# Patient Record
Sex: FEMALE | Race: WHITE | NOT HISPANIC OR LATINO | Employment: FULL TIME | URBAN - METROPOLITAN AREA
[De-identification: names, ages, dates, MRNs, and addresses within clinical notes are randomized per-mention and may not be internally consistent; named-entity substitution may affect disease eponyms.]

---

## 2019-09-12 ENCOUNTER — HOSPITAL ENCOUNTER (EMERGENCY)
Facility: HOSPITAL | Age: 34
Discharge: HOME | End: 2019-09-12
Attending: EMERGENCY MEDICINE | Admitting: EMERGENCY MEDICINE

## 2019-09-12 VITALS
TEMPERATURE: 98.5 F | DIASTOLIC BLOOD PRESSURE: 79 MMHG | BODY MASS INDEX: 36.25 KG/M2 | HEART RATE: 71 BPM | HEIGHT: 65 IN | RESPIRATION RATE: 16 BRPM | SYSTOLIC BLOOD PRESSURE: 121 MMHG | OXYGEN SATURATION: 99 % | WEIGHT: 217.59 LBS

## 2019-09-12 DIAGNOSIS — N93.9 VAGINAL BLEEDING: Primary | ICD-10-CM

## 2019-09-12 LAB
ABO GROUP BLD: NORMAL
B-HCG UR QL: NEGATIVE
BASOPHILS # BLD AUTO: 0.1 10*3/MM3 (ref 0–0.2)
BASOPHILS NFR BLD AUTO: 0.7 % (ref 0–1.5)
BLD GP AB SCN SERPL QL: NEGATIVE
DEPRECATED RDW RBC AUTO: 42.9 FL (ref 37–54)
EOSINOPHIL # BLD AUTO: 0.1 10*3/MM3 (ref 0–0.4)
EOSINOPHIL NFR BLD AUTO: 1.6 % (ref 0.3–6.2)
ERYTHROCYTE [DISTWIDTH] IN BLOOD BY AUTOMATED COUNT: 14 % (ref 12.3–15.4)
HCT VFR BLD AUTO: 36.1 % (ref 34–46.6)
HGB BLD-MCNC: 12.1 G/DL (ref 12–15.9)
LYMPHOCYTES # BLD AUTO: 2.1 10*3/MM3 (ref 0.7–3.1)
LYMPHOCYTES NFR BLD AUTO: 24.9 % (ref 19.6–45.3)
MCH RBC QN AUTO: 29.2 PG (ref 26.6–33)
MCHC RBC AUTO-ENTMCNC: 33.5 G/DL (ref 31.5–35.7)
MCV RBC AUTO: 87.2 FL (ref 79–97)
MONOCYTES # BLD AUTO: 0.6 10*3/MM3 (ref 0.1–0.9)
MONOCYTES NFR BLD AUTO: 7.3 % (ref 5–12)
NEUTROPHILS # BLD AUTO: 5.6 10*3/MM3 (ref 1.7–7)
NEUTROPHILS NFR BLD AUTO: 65.5 % (ref 42.7–76)
NRBC BLD AUTO-RTO: 0.1 /100 WBC (ref 0–0.2)
PLATELET # BLD AUTO: 185 10*3/MM3 (ref 140–450)
PMV BLD AUTO: 8.8 FL (ref 6–12)
RBC # BLD AUTO: 4.14 10*6/MM3 (ref 3.77–5.28)
RH BLD: POSITIVE
T&S EXPIRATION DATE: NORMAL
WBC NRBC COR # BLD: 8.5 10*3/MM3 (ref 3.4–10.8)

## 2019-09-12 PROCEDURE — 85025 COMPLETE CBC W/AUTO DIFF WBC: CPT | Performed by: EMERGENCY MEDICINE

## 2019-09-12 PROCEDURE — 81025 URINE PREGNANCY TEST: CPT | Performed by: EMERGENCY MEDICINE

## 2019-09-12 PROCEDURE — 86901 BLOOD TYPING SEROLOGIC RH(D): CPT

## 2019-09-12 PROCEDURE — 86901 BLOOD TYPING SEROLOGIC RH(D): CPT | Performed by: EMERGENCY MEDICINE

## 2019-09-12 PROCEDURE — 99283 EMERGENCY DEPT VISIT LOW MDM: CPT

## 2019-09-12 PROCEDURE — 86900 BLOOD TYPING SEROLOGIC ABO: CPT

## 2019-09-12 PROCEDURE — 86900 BLOOD TYPING SEROLOGIC ABO: CPT | Performed by: EMERGENCY MEDICINE

## 2019-09-12 PROCEDURE — 86850 RBC ANTIBODY SCREEN: CPT | Performed by: EMERGENCY MEDICINE

## 2019-09-12 NOTE — ED PROVIDER NOTES
Subjective   Chief complaint vaginal bleeding.  This is a 34-year-old presents with vaginal bleeding and some sharp stabbing crampy back pain she has had for the last 2 days she states the pain is 4/10 she has no other known alleviating or exacerbating factors.  She reports that it has been exactly 1 month since her last menstrual period but states that she feels like this bleeding is not her menstrual.  She has had prior bilateral tubal ligation.  She denies any dysuria frequency urgency or hematuria.        History provided by:  Patient      Review of Systems   Constitutional: Negative for chills and fever.   HENT: Negative for congestion and sore throat.    Eyes: Negative for redness.   Respiratory: Negative for shortness of breath.    Cardiovascular: Negative for chest pain.   Gastrointestinal: Negative for abdominal pain.   Endocrine: Negative for cold intolerance and heat intolerance.   Genitourinary: Positive for vaginal bleeding. Negative for difficulty urinating and dysuria.   Musculoskeletal: Positive for back pain.   Skin: Negative for rash.   Allergic/Immunologic: Negative for immunocompromised state.   Neurological: Negative for dizziness and weakness.   Hematological: Negative for adenopathy.   Psychiatric/Behavioral: Negative for confusion.   All other systems reviewed and are negative.      History reviewed. No pertinent past medical history.    No Known Allergies    History reviewed. No pertinent surgical history.    No family history on file.    Social History     Socioeconomic History   • Marital status:      Spouse name: Not on file   • Number of children: Not on file   • Years of education: Not on file   • Highest education level: Not on file           Objective   Physical Exam   Constitutional: She is oriented to person, place, and time. She appears well-developed and well-nourished. No distress.   HENT:   Head: Normocephalic and atraumatic.   Right Ear: External ear normal.   Left Ear:  External ear normal.   Nose: Nose normal.   Eyes: Conjunctivae and EOM are normal. Pupils are equal, round, and reactive to light.   Neck: Normal range of motion. Neck supple. No JVD present.   Cardiovascular: Normal rate, regular rhythm, normal heart sounds and intact distal pulses.   Pulmonary/Chest: Effort normal and breath sounds normal. No stridor. She has no wheezes. She has no rales.   Abdominal: Soft. Bowel sounds are normal. She exhibits no mass. There is no tenderness. There is no rebound and no guarding. No hernia.   Musculoskeletal: Normal range of motion.   Lymphadenopathy:     She has no cervical adenopathy.   Neurological: She is alert and oriented to person, place, and time. No cranial nerve deficit.   Skin: Skin is warm and dry. Capillary refill takes less than 2 seconds. No rash noted.   Psychiatric: She has a normal mood and affect.   Nursing note and vitals reviewed.  The patient was placed in lithotomy position.  External genitalia were normal there was no evidence of rash external lesions or abscesses.  Speculum was inserted.  Posterior vaginal vault was examined.    There was a minimal amount of blood in the posterior vaginal vault    Bimanual exam was performed.  Uterus was nonenlarged and nontender and there was no adnexal enlargement or tenderness noted.  Os was closed.  No masses were felt.      Procedures           ED Course        No orders to display     Lab Results (last 72 hours)     Procedure Component Value Units Date/Time    Pregnancy, Urine - Urine, Clean Catch [[id number]]  (Normal) Collected:  09/12/19 0437    Specimen:  Urine, Clean Catch Updated:  09/12/19 0451     HCG, Urine QL Negative    CBC & Differential [[id number]] Collected:  09/12/19 0449    Specimen:  Blood Updated:  09/12/19 0501    Narrative:       The following orders were created for panel order CBC & Differential.  Procedure                               Abnormality         Status                     ---------      "                          -----------         ------                     CBC Auto Differential[[id number]]        Normal              Final result                 Please view results for these tests on the individual orders.    CBC Auto Differential [[id number]]  (Normal) Collected:  09/12/19 0449    Specimen:  Blood Updated:  09/12/19 0501     WBC 8.50 10*3/mm3      RBC 4.14 10*6/mm3      Hemoglobin 12.1 g/dL      Hematocrit 36.1 %      MCV 87.2 fL      MCH 29.2 pg      MCHC 33.5 g/dL      RDW 14.0 %      RDW-SD 42.9 fl      MPV 8.8 fL      Platelets 185 10*3/mm3      Neutrophil % 65.5 %      Lymphocyte % 24.9 %      Monocyte % 7.3 %      Eosinophil % 1.6 %      Basophil % 0.7 %      Neutrophils, Absolute 5.60 10*3/mm3      Lymphocytes, Absolute 2.10 10*3/mm3      Monocytes, Absolute 0.60 10*3/mm3      Eosinophils, Absolute 0.10 10*3/mm3      Basophils, Absolute 0.10 10*3/mm3      nRBC 0.1 /100 WBC         Medications - No data to display  /86 (BP Location: Left arm, Patient Position: Sitting)   Pulse 68   Temp 98.4 °F (36.9 °C) (Oral)   Resp 18   Ht 165.1 cm (65\")   Wt 98.7 kg (217 lb 9.5 oz)   SpO2 97%   BMI 36.21 kg/m²   Old records reviewed.  Previous CT scan from August 14 shows some colitis but no other acute disease ultrasound from same date showed no acute abnormality.  Recent laboratories from earlier this month shows no acute abnormalities  I discussed results with the patient as well as follow care instructions the patient expressed understanding.        MDM  Differential diagnosis; this does not constitute the entirety of considered causes:    DKA, intra-abdominal infection, dissection, pneumoperitoneum, peritonitis, peptic ulcer disease, pancreatitis, hepatitis, ischemic bowel, bowel obstruction, appendicitis, cholelithiasis, cholecystitis, ureterolithiasis, nephrolithiasis, pregnancy, ectopic pregnancy    Final diagnoses:   Vaginal bleeding              Xavi Rodriguez MD  09/12/19 " [id number]

## 2019-09-12 NOTE — DISCHARGE INSTRUCTIONS
Push clear fluids.  See PMD in 2 days for recheck.  Return for increased pain fevers or chills nausea vomiting

## 2019-09-12 NOTE — ED NOTES
Pt reports vaginal bleeding x1 day, reports dark red blood with some clots. Pt also c/o low back pain x1 week. Reports she was seen by PCP for back pain, labs performed in office and states she had blood in her urine. Pt reports it is almost time for her menstrual, but states this bleeding feels different than her normal menstrual. LMP 8/9/2019. Pt reports hx tubal ligation.      Rika Owusu, KRISTIE  09/12/19 0514

## 2021-06-22 PROBLEM — I10 HYPERTENSION: Status: ACTIVE | Noted: 2021-06-22

## 2021-06-23 ENCOUNTER — TELEPHONE (OUTPATIENT)
Dept: ONCOLOGY | Facility: CLINIC | Age: 36
End: 2021-06-23

## 2021-06-23 NOTE — TELEPHONE ENCOUNTER
Caller: Megan Madrid    Relationship to patient: Self    Best call back number:[phone]    Chief complaint: PATIENT HAS APPT. SCHEDULED FOR 6/28/21. SHE IS GOING OUT OF TOWN ON 6/25/21 AND NEEDS TO RESCHEDULE.      Type of visit: LAB/NP OV    Requested date: ANYTIME July 1ST    If rescheduling, when is the original appointment: 6/28/21    Additional notes: PLEASE CONTACT PATIENT AT NUMBER LISTED ABOVE TO RESCHEDULE

## 2021-06-25 NOTE — TELEPHONE ENCOUNTER
Contacted patient and rescheduled appointment to 07/21 at 1530. I was unable to schedule appointment due to restrictions, sent information to [facility],  and appointment is rescheduled.

## 2021-06-28 ENCOUNTER — APPOINTMENT (OUTPATIENT)
Dept: LAB | Facility: HOSPITAL | Age: 36
End: 2021-06-28

## 2021-07-16 NOTE — PROGRESS NOTES
Genetic Note    Megan Madrid  1985    Primary Care Physician: Keshav Hernandez MD  Referring Physician: Ann Sweeney MD  Reason For Visit: High Risk Evaluation For malignancy    Chief Complaint   Patient presents with   • Appointment     Family history of cancer       HPI       This is a 36-year-old female who does not have any personal history of cancer but he is here today due to strong family history of breast cancer in her sister at the age of 40.  Patient's father also developed pancreatic cancer and  from metastatic disease in his 50s.  Otherwise there are no additional family cancer history.  Patient has no history of abnormal mammogram.  She had her first mammogram about a month ago.  As far as she is aware there is no maternal family cancer history.    Past Medical History:   Diagnosis Date   • Stomach disorder    • Stomach ulcer        Past Surgical History:   Procedure Laterality Date   • CHOLECYSTECTOMY         No current outpatient medications on file.    No Known Allergies    Family History   Problem Relation Age of Onset   • Breast cancer Sister 40        had Genetic testing, Negative   • Pancreatic cancer Father        Cancer-related family history includes Breast cancer (age of onset: 40) in her sister; Pancreatic cancer in her father.    Social History     Tobacco Use   • Smoking status: Never Smoker   • Smokeless tobacco: Never Used   Substance Use Topics   • Alcohol use: Yes     Comment: 1-2 per week   • Drug use: Never       Review of Systems   Constitutional: Negative for chills and fever.   HENT: Negative for ear pain, mouth sores, nosebleeds and sore throat.    Eyes: Negative for photophobia and visual disturbance.   Respiratory: Negative for wheezing and stridor.    Cardiovascular: Negative for chest pain and palpitations.   Gastrointestinal: Negative for abdominal pain, diarrhea, nausea and vomiting.   Endocrine: Negative for cold intolerance and heat intolerance.  "  Genitourinary: Negative for dysuria and hematuria.   Musculoskeletal: Negative for joint swelling and neck stiffness.   Skin: Negative for color change and rash.   Neurological: Negative for seizures and syncope.   Hematological: Negative for adenopathy.   Psychiatric/Behavioral: Negative for agitation, confusion and hallucinations.       Objective:    Vitals:    07/21/21 1542   BP: 127/86   Pulse: 66   Resp: 18   Temp: 97.5 °F (36.4 °C)   TempSrc: Infrared   Weight: 114 kg (252 lb)   Height: 162.6 cm (64\")   PainSc: 0-No pain       (0) Fully active, able to carry on all predisease performance without restriction    Physical Exam  Vitals and nursing note reviewed.   Constitutional:       General: She is not in acute distress.     Appearance: She is not diaphoretic.   HENT:      Head: Normocephalic and atraumatic.   Eyes:      General: No scleral icterus.        Right eye: No discharge.         Left eye: No discharge.      Conjunctiva/sclera: Conjunctivae normal.   Neck:      Thyroid: No thyromegaly.   Cardiovascular:      Rate and Rhythm: Normal rate and regular rhythm.      Heart sounds: Normal heart sounds. No friction rub. No gallop.    Pulmonary:      Effort: Pulmonary effort is normal. No respiratory distress.      Breath sounds: No stridor. No wheezing.   Abdominal:      General: Bowel sounds are normal.      Palpations: Abdomen is soft. There is no mass.      Tenderness: There is no abdominal tenderness. There is no guarding or rebound.   Musculoskeletal:         General: No tenderness. Normal range of motion.      Cervical back: Normal range of motion and neck supple.   Lymphadenopathy:      Cervical: No cervical adenopathy.   Skin:     General: Skin is warm.      Findings: No erythema or rash.   Neurological:      Mental Status: She is alert and oriented to person, place, and time.      Motor: No abnormal muscle tone.   Psychiatric:         Behavior: Behavior normal.         Assessment/Plan     Family " history of cancer  - Genetic Testing - Blood,  - Genetic Testing - Blood,      1. Family history of breast cancer in her sister at age of 40.  Also family history of pancreatic cancer in her father at age of 59.  There is concern for hereditary cancer syndrome.        Discussion      Today's risk assessment is based on the history the patient has provided.  We discussed that the best people to screen are the ones who have been affected by malignancy as their result is more informative.  We reviewed all the hallmarks of hereditary cancer syndrome including multiple relatives on the same side of the family being affected by malignancy, cancer diagnosis in young individuals, different cancers in one individual.      We discussed the implications of a positive deleterious mutation which can result in recommendations for prophylactic surgeries, chemoprevention, lifestyle modifications and aggressive screening with mammogram and MRI, and also increased breast awareness and breast self exams.  Patient understands if she screens positive, then at-risk family members will need to be screened as well.  Each offspring has a 50% chance of inheriting a deleterious mutation if positive in a parent.      A negative deleterious mutation will make hereditary cancer syndrome much less likely, but does not rule out the possibility of a gene mutation that cannot be detected with the available technology.  She also understands that a negative gene test result might indicate that the cancers that occurred in her family were most likely sporadic, or even if there is a mutation the patient did not inherit it.     We discussed variant of unknown significance.  Patient understands that no medical decisions will be made based on a variant of unknown significance, but I did stress the importance of maintaining contact information with us should this be the case.      We discussed the financial implications of the above testing.  Patient  understands when the right clinical criteria are met that most insurance companies will cover the cost.      We also discussed the various insurability issues with a deleterious mutation result.     Patient has give us consent to proceed with comprehensive genetic analysis.        Plans    · Comprehensive gene analysis with cancer next technology  · Follow-up 6 weeks review results and make further recommendations         Thank you very much allowing participate in the care of Megan, I will keep you updated on her progress             I spent 45 total minutes, face-to-face, caring for Megan today.  80% of this time involved counseling and/or coordination of care as documented within this note.

## 2021-07-21 ENCOUNTER — CONSULT (OUTPATIENT)
Dept: ONCOLOGY | Facility: CLINIC | Age: 36
End: 2021-07-21

## 2021-07-21 ENCOUNTER — LAB (OUTPATIENT)
Dept: LAB | Facility: HOSPITAL | Age: 36
End: 2021-07-21

## 2021-07-21 VITALS
RESPIRATION RATE: 18 BRPM | SYSTOLIC BLOOD PRESSURE: 127 MMHG | BODY MASS INDEX: 43.02 KG/M2 | TEMPERATURE: 97.5 F | HEIGHT: 64 IN | HEART RATE: 66 BPM | DIASTOLIC BLOOD PRESSURE: 86 MMHG | WEIGHT: 252 LBS

## 2021-07-21 DIAGNOSIS — Z80.9 FAMILY HISTORY OF CANCER: Primary | ICD-10-CM

## 2021-07-21 PROCEDURE — 99204 OFFICE O/P NEW MOD 45 MIN: CPT | Performed by: INTERNAL MEDICINE

## 2021-08-16 LAB
REF LAB TEST METHOD: NORMAL
WHOLE BLOOD SORT: NORMAL

## 2021-09-02 ENCOUNTER — APPOINTMENT (OUTPATIENT)
Dept: LAB | Facility: HOSPITAL | Age: 36
End: 2021-09-02

## 2021-09-02 ENCOUNTER — OFFICE VISIT (OUTPATIENT)
Dept: ONCOLOGY | Facility: CLINIC | Age: 36
End: 2021-09-02

## 2021-09-02 VITALS
BODY MASS INDEX: 41.15 KG/M2 | WEIGHT: 241 LBS | HEART RATE: 66 BPM | RESPIRATION RATE: 18 BRPM | SYSTOLIC BLOOD PRESSURE: 131 MMHG | HEIGHT: 64 IN | TEMPERATURE: 97.8 F | DIASTOLIC BLOOD PRESSURE: 85 MMHG

## 2021-09-02 DIAGNOSIS — Z80.9 FAMILY HISTORY OF CANCER: Primary | ICD-10-CM

## 2021-09-02 PROCEDURE — 99214 OFFICE O/P EST MOD 30 MIN: CPT | Performed by: INTERNAL MEDICINE

## 2021-09-02 NOTE — PROGRESS NOTES
Genetic Note    Megan Madrid  1985    Primary Care Physician: Keshav Hernandez MD  Referring Physician: Keshav Hernandez MD  Reason For Visit: High Risk Evaluation For malignancy    Chief Complaint   Patient presents with   • Follow-up     Family history of cancer       HPI       This is a 36-year-old female who does not have any personal history of cancer but he is here today due to strong family history of breast cancer in her sister at the age of 40.  Patient's father also developed pancreatic cancer and  from metastatic disease in his 50s.  Otherwise there are no additional family cancer history.  Patient has no history of abnormal mammogram.  She had her first mammogram about a month ago.  As far as she is aware there is no maternal family cancer history.    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    Past Medical History:   Diagnosis Date   • Stomach disorder    • Stomach ulcer        Past Surgical History:   Procedure Laterality Date   • CHOLECYSTECTOMY         No current outpatient medications on file.    No Known Allergies    Family History   Problem Relation Age of Onset   • Breast cancer Sister 40        had Genetic testing, Negative   • Pancreatic cancer Father        Cancer-related family history includes Breast cancer (age of onset: 40) in her sister; Pancreatic cancer in her father.    Social History     Tobacco Use   • Smoking status: Never Smoker   • Smokeless tobacco: Never Used   Substance Use Topics   • Alcohol use: Yes     Comment: 1-2 per week   • Drug use: Never       Review of Systems   Constitutional: Negative for chills and fever.   HENT: Negative for ear pain, mouth sores, nosebleeds and sore throat.    Eyes: Negative for photophobia and visual disturbance.   Respiratory: Negative for wheezing and stridor.    Cardiovascular: Negative for chest  "pain and palpitations.   Gastrointestinal: Negative for abdominal pain, diarrhea, nausea and vomiting.   Endocrine: Negative for cold intolerance and heat intolerance.   Genitourinary: Negative for dysuria and hematuria.   Musculoskeletal: Negative for joint swelling and neck stiffness.   Skin: Negative for color change and rash.   Neurological: Negative for seizures and syncope.   Hematological: Negative for adenopathy.   Psychiatric/Behavioral: Negative for agitation, confusion and hallucinations.       Objective:    Vitals:    09/02/21 1420   BP: 131/85   Pulse: 66   Resp: 18   Temp: 97.8 °F (36.6 °C)   TempSrc: Infrared   Weight: 109 kg (241 lb)   Height: 162.6 cm (64\")   PainSc: 0-No pain       (0) Fully active, able to carry on all predisease performance without restriction    Physical Exam  Vitals and nursing note reviewed.   Constitutional:       General: She is not in acute distress.     Appearance: She is not diaphoretic.   HENT:      Head: Normocephalic and atraumatic.   Eyes:      General: No scleral icterus.        Right eye: No discharge.         Left eye: No discharge.      Conjunctiva/sclera: Conjunctivae normal.   Neck:      Thyroid: No thyromegaly.   Cardiovascular:      Rate and Rhythm: Normal rate and regular rhythm.      Heart sounds: Normal heart sounds. No friction rub. No gallop.    Pulmonary:      Effort: Pulmonary effort is normal. No respiratory distress.      Breath sounds: No stridor. No wheezing.   Abdominal:      General: Bowel sounds are normal.      Palpations: Abdomen is soft. There is no mass.      Tenderness: There is no abdominal tenderness. There is no guarding or rebound.   Musculoskeletal:         General: No tenderness. Normal range of motion.      Cervical back: Normal range of motion and neck supple.   Lymphadenopathy:      Cervical: No cervical adenopathy.   Skin:     General: Skin is warm.      Findings: No erythema or rash.   Neurological:      Mental Status: She is " alert and oriented to person, place, and time.      Motor: No abnormal muscle tone.   Psychiatric:         Behavior: Behavior normal.       I have reexamined the patient and the results are consistent with the previously documented exam. Johannaaissatou Gallo MD       Assessment/Plan     There are no diagnoses linked to this encounter.    1. Family history of breast cancer in her sister at age of 40.  Also family history of pancreatic cancer in her father at age of 59.  There is concern for hereditary cancer syndrome.  2. Comprehensive gene analysis with cancer next technology was negative for any significant mutation but there was  variant of unknown significance seen in the [facility] L1 gene  3. Sara Jean risk estimates at 18.3 % lifetime risk for breast cancer        Discussion      Today's risk assessment is based on the history the patient has provided.  We discussed that the best people to screen are the ones who have been affected by malignancy as their result is more informative.  We reviewed all the hallmarks of hereditary cancer syndrome including multiple relatives on the same side of the family being affected by malignancy, cancer diagnosis in young individuals, different cancers in one individual.      I have reviewed the results of a comprehensive gene analysis with cancer next technology.  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  We discussed that VUS:    Variant of unknown significance has been found on genetic test.  Based on currently available data it is unclear whether this VUS is pathogenic or benign variant.  80% of variant of unknown significance will return as benign, 20% will truly be pathogenic.  Therefore, I did recommend to patient that there is need to maintain contact information with us for updates on VUS reclassification.  At this point in time, we cannot use variant of unknown significance to make any clinical decisions for patient and family  members.  Clinical decisions will be based on personal history, family history and any positive pathogenic mutations identified.  We also discussed about variant reclassification program.  Patient has been given information to follow up with the genetic lab if there is interest to participate in this study, which may help to clarify this variant in the future.       We have reviewed patient's results.  She has screened negative for any deleterious mutation that could increase her risk for developing cancer.  I explained to patient that the cancers that occurred in her family may have all been sporadic or could still be due to a mutation that we are not able to detect with the available technology.  There are other genes suspected to increase risk breast cancer that are yet to be identified.  About 5% to 10% of all breast cancers are due to genetic mutation, the rest are due to hormonal, reproductive, endocrinology and lifestyle issues.  Patient will continue to monitor her family cancer history and update us of any changes that occur in the future.  Her negative result could imply that even if there is a mutation in the family she may not have inherited it. We discussed the concept of familial breast cancer syndrome, which could play a role in her family cancer development.       We discussed general breast health care such as increased breast awareness, monthly breast self- exams, 12 monthly clinical breast exam and annual mammograms.        We discussed risk modifications such as limiting alcohol ingestion to one to two drinks per week, avoidance of smoking and avoidance of postmenopausal weight gain.  We discussed breast cancer risks associated with prolonged hormone replacement therapy.    We discussed signs and symptoms for patient to watch out for and notify us should they occur including breast lumps, nipple discharge, skin discoloration, breast pain or any other concerns she may have       A copy of her  genetic results have been given to patient for her own records keeping.        Patient has been given opportunities to ask questions, which have all been answered to the best of abilities.          Plans    · Copy of her genetic results was given to patient for her own records keeping  · Follow-up 2 to 3 years from now  · Patient encouraged to keep track of her family cancer history and update us of any changes  · All questions answered         Thank you very much allowing participate in the care of Megan, I will keep you updated on her progress             I spent 30 total minutes, face-to-face, caring for Megan today.  80% of this time involved counseling and/or coordination of care as documented within this note.

## 2021-11-17 ENCOUNTER — APPOINTMENT (OUTPATIENT)
Dept: CT IMAGING | Facility: HOSPITAL | Age: 36
End: 2021-11-17

## 2021-11-17 ENCOUNTER — HOSPITAL ENCOUNTER (EMERGENCY)
Facility: HOSPITAL | Age: 36
Discharge: HOME | End: 2021-11-17
Admitting: EMERGENCY MEDICINE

## 2021-11-17 VITALS
TEMPERATURE: 98.2 F | RESPIRATION RATE: 16 BRPM | WEIGHT: 252.87 LBS | OXYGEN SATURATION: 100 % | HEART RATE: 70 BPM | BODY MASS INDEX: 44.8 KG/M2 | DIASTOLIC BLOOD PRESSURE: 72 MMHG | SYSTOLIC BLOOD PRESSURE: 128 MMHG | HEIGHT: 63 IN

## 2021-11-17 DIAGNOSIS — R10.9 ABDOMINAL PAIN, UNSPECIFIED ABDOMINAL LOCATION: Primary | ICD-10-CM

## 2021-11-17 LAB
ALBUMIN SERPL-MCNC: 4.1 G/DL (ref 3.5–5.2)
ALBUMIN/GLOB SERPL: 1.5 G/DL
ALP SERPL-CCNC: 118 U/L (ref 39–117)
ALT SERPL W P-5'-P-CCNC: 19 U/L (ref 1–33)
ANION GAP SERPL CALCULATED.3IONS-SCNC: 14 MMOL/L (ref 5–15)
AST SERPL-CCNC: 18 U/L (ref 1–32)
B-HCG UR QL: NEGATIVE
BASOPHILS # BLD AUTO: 0 10*3/MM3 (ref 0–0.2)
BASOPHILS NFR BLD AUTO: 0.4 % (ref 0–1.5)
BILIRUB SERPL-MCNC: 0.3 MG/DL (ref 0–1.2)
BILIRUB UR QL STRIP: NEGATIVE
BUN SERPL-MCNC: 13 MG/DL (ref 6–20)
BUN/CREAT SERPL: 15.5 (ref 7–25)
CALCIUM SPEC-SCNC: 8.8 MG/DL (ref 8.6–10.5)
CHLORIDE SERPL-SCNC: 102 MMOL/L (ref 98–107)
CLARITY UR: ABNORMAL
CO2 SERPL-SCNC: 23 MMOL/L (ref 22–29)
COLOR UR: YELLOW
CREAT SERPL-MCNC: 0.84 MG/DL (ref 0.57–1)
DEPRECATED RDW RBC AUTO: 42.4 FL (ref 37–54)
EOSINOPHIL # BLD AUTO: 0.2 10*3/MM3 (ref 0–0.4)
EOSINOPHIL NFR BLD AUTO: 2.3 % (ref 0.3–6.2)
ERYTHROCYTE [DISTWIDTH] IN BLOOD BY AUTOMATED COUNT: 14.1 % (ref 12.3–15.4)
GFR SERPL CREATININE-BSD FRML MDRD: 77 ML/MIN/1.73
GLOBULIN UR ELPH-MCNC: 2.7 GM/DL
GLUCOSE SERPL-MCNC: 89 MG/DL (ref 65–99)
GLUCOSE UR STRIP-MCNC: NEGATIVE MG/DL
HCT VFR BLD AUTO: 37 % (ref 34–46.6)
HGB BLD-MCNC: 12.5 G/DL (ref 12–15.9)
HGB UR QL STRIP.AUTO: NEGATIVE
KETONES UR QL STRIP: NEGATIVE
LEUKOCYTE ESTERASE UR QL STRIP.AUTO: NEGATIVE
LIPASE SERPL-CCNC: 26 U/L (ref 13–60)
LYMPHOCYTES # BLD AUTO: 1.8 10*3/MM3 (ref 0.7–3.1)
LYMPHOCYTES NFR BLD AUTO: 24 % (ref 19.6–45.3)
MCH RBC QN AUTO: 29.4 PG (ref 26.6–33)
MCHC RBC AUTO-ENTMCNC: 33.8 G/DL (ref 31.5–35.7)
MCV RBC AUTO: 87.1 FL (ref 79–97)
MONOCYTES # BLD AUTO: 0.7 10*3/MM3 (ref 0.1–0.9)
MONOCYTES NFR BLD AUTO: 8.8 % (ref 5–12)
NEUTROPHILS NFR BLD AUTO: 4.9 10*3/MM3 (ref 1.7–7)
NEUTROPHILS NFR BLD AUTO: 64.5 % (ref 42.7–76)
NITRITE UR QL STRIP: NEGATIVE
NRBC BLD AUTO-RTO: 0.1 /100 WBC (ref 0–0.2)
PH UR STRIP.AUTO: 7.5 [PH] (ref 5–8)
PLATELET # BLD AUTO: 256 10*3/MM3 (ref 140–450)
PMV BLD AUTO: 8.3 FL (ref 6–12)
POTASSIUM SERPL-SCNC: 4.3 MMOL/L (ref 3.5–5.2)
PROT SERPL-MCNC: 6.8 G/DL (ref 6–8.5)
PROT UR QL STRIP: NEGATIVE
RBC # BLD AUTO: 4.25 10*6/MM3 (ref 3.77–5.28)
SODIUM SERPL-SCNC: 139 MMOL/L (ref 136–145)
SP GR UR STRIP: 1.02 (ref 1–1.03)
UROBILINOGEN UR QL STRIP: ABNORMAL
WBC NRBC COR # BLD: 7.7 10*3/MM3 (ref 3.4–10.8)

## 2021-11-17 PROCEDURE — 99283 EMERGENCY DEPT VISIT LOW MDM: CPT

## 2021-11-17 PROCEDURE — 74177 CT ABD & PELVIS W/CONTRAST: CPT

## 2021-11-17 PROCEDURE — 80053 COMPREHEN METABOLIC PANEL: CPT | Performed by: PHYSICIAN ASSISTANT

## 2021-11-17 PROCEDURE — 81025 URINE PREGNANCY TEST: CPT | Performed by: PHYSICIAN ASSISTANT

## 2021-11-17 PROCEDURE — 0 IOPAMIDOL PER 1 ML: Performed by: PHYSICIAN ASSISTANT

## 2021-11-17 PROCEDURE — 81003 URINALYSIS AUTO W/O SCOPE: CPT | Performed by: PHYSICIAN ASSISTANT

## 2021-11-17 PROCEDURE — 83690 ASSAY OF LIPASE: CPT | Performed by: PHYSICIAN ASSISTANT

## 2021-11-17 PROCEDURE — 85025 COMPLETE CBC W/AUTO DIFF WBC: CPT | Performed by: PHYSICIAN ASSISTANT

## 2021-11-17 RX ORDER — SODIUM CHLORIDE 0.9 % (FLUSH) 0.9 %
10 SYRINGE (ML) INJECTION AS NEEDED
Status: DISCONTINUED | OUTPATIENT
Start: 2021-11-17 | End: 2021-11-17 | Stop reason: HOSPADM

## 2021-11-17 RX ORDER — ONDANSETRON 4 MG/1
4 TABLET, ORALLY DISINTEGRATING ORAL EVERY 8 HOURS PRN
Qty: 20 TABLET | Refills: 0 | Status: SHIPPED | OUTPATIENT
Start: 2021-11-17

## 2021-11-17 RX ADMIN — IOPAMIDOL 100 ML: 755 INJECTION, SOLUTION INTRAVENOUS at 19:03

## 2021-11-17 NOTE — ED PROVIDER NOTES
Subjective   Chief Complaint: Abdominal pain    Patient is a 36-year-old  female history of peptic ulcer disease presents the ER with complaints of lower abdominal pain for 2 days.  Patient reports lower abdominal pain, cramping and pressure that she rates a 6/10.  She denies any vomiting or diarrhea or dysuria.  She does report some nausea.  She denies any vaginal bleeding or discharge.  Denies chest pain shortness of breath headache or fever or chills.  Patient ports abdominal surgical history of tubal ligation and cholecystectomy.    Location: Lower abdominal     Quality: Pressure, cramping    Duration: 2 days    Timing: Constant    Severity: Mild to moderate    Associated Symptoms: Nausea    PCP: Keshav Domínguez      History provided by:  Patient      Review of Systems   Constitutional: Negative for chills and fever.   HENT: Negative for sore throat and trouble swallowing.    Respiratory: Negative for shortness of breath and wheezing.    Cardiovascular: Negative for chest pain.   Gastrointestinal: Positive for abdominal pain and nausea. Negative for diarrhea and vomiting.   Genitourinary: Negative for dysuria, vaginal bleeding and vaginal discharge.   Musculoskeletal: Negative for joint swelling.   Skin: Negative for rash.   Neurological: Negative for weakness and headaches.   Psychiatric/Behavioral: Negative for behavioral problems.   All other systems reviewed and are negative.      Past Medical History:   Diagnosis Date   • Stomach disorder    • Stomach ulcer        No Known Allergies    Past Surgical History:   Procedure Laterality Date   • CHOLECYSTECTOMY  2016       Family History   Problem Relation Age of Onset   • Breast cancer Sister 40        had Genetic testing, Negative   • Pancreatic cancer Father        Social History     Socioeconomic History   • Marital status:    Tobacco Use   • Smoking status: Never Smoker   • Smokeless tobacco: Never Used   Substance and Sexual Activity   • Alcohol  "use: Yes     Comment: 1-2 per week   • Drug use: Never   • Sexual activity: Defer           Objective   Physical Exam  Vitals and nursing note reviewed.   Constitutional:       Appearance: Normal appearance. She is well-developed. She is not ill-appearing or toxic-appearing.   HENT:      Head: Normocephalic and atraumatic.   Eyes:      Pupils: Pupils are equal, round, and reactive to light.   Cardiovascular:      Rate and Rhythm: Normal rate and regular rhythm.      Heart sounds: Normal heart sounds.   Pulmonary:      Effort: Pulmonary effort is normal. No respiratory distress.      Breath sounds: Normal breath sounds. No wheezing.   Abdominal:      General: Abdomen is flat. Bowel sounds are normal. There is no distension.      Palpations: Abdomen is soft.      Tenderness: There is abdominal tenderness in the suprapubic area. There is no right CVA tenderness or left CVA tenderness.   Genitourinary:     Rectum: Normal.   Skin:     General: Skin is warm and dry.      Capillary Refill: Capillary refill takes less than 2 seconds.      Findings: No rash.   Neurological:      Mental Status: She is alert and oriented to person, place, and time.   Psychiatric:         Behavior: Behavior normal.         Procedures           ED Course    /78   Pulse 66   Temp 98.1 °F (36.7 °C) (Oral)   Resp 18   Ht 160 cm (63\")   Wt 115 kg (252 lb 13.9 oz)   SpO2 100%   Breastfeeding No   BMI 44.79 kg/m²   Labs Reviewed   COMPREHENSIVE METABOLIC PANEL - Abnormal; Notable for the following components:       Result Value    Alkaline Phosphatase 118 (*)     All other components within normal limits    Narrative:     GFR Normal >60  Chronic Kidney Disease <60  Kidney Failure <15     URINALYSIS W/ CULTURE IF INDICATED - Abnormal; Notable for the following components:    Appearance, UA Cloudy (*)     All other components within normal limits    Narrative:     Urine microscopic not indicated.   LIPASE - Normal   PREGNANCY, URINE - " Normal   CBC WITH AUTO DIFFERENTIAL - Normal   CBC AND DIFFERENTIAL    Narrative:     The following orders were created for panel order CBC & Differential.  Procedure                               Abnormality         Status                     ---------                               -----------         ------                     CBC Auto Differential[[id number]]        Normal              Final result                 Please view results for these tests on the individual orders.     Medications   sodium chloride 0.9 % flush 10 mL (has no administration in time range)   iopamidol (ISOVUE-370) 76 % injection 100 mL (100 mL Intravenous Given 11/17/21 1903)     CT Abdomen Pelvis With Contrast    Result Date: 11/17/2021  1. No acute abnormality in the abdomen or pelvis. 2. Small cluster of groundglass opacities in medial basilar right lower lobe may relate to pneumonia, correlate for associated clinical findings. 3. Status post cholecystectomy.     Electronically Signed By-Pierre Whaley MD On:11/17/2021 7:36 PM This report was finalized on [id number] by  Pierre Whaley MD.                                           MDM  Number of Diagnoses or Management Options  Abdominal pain, unspecified abdominal location  Diagnosis management comments: MEDICAL DECISION  Epic Chart Review: No recent admissions  Comorbidities: GERD  Differentials: Diverticulitis, ovarian cyst, UTI, viral gastroenteritis; this list is not all inclusive and does not constitute the entirety of considered causes  Radiology interpretation:  Images reviewed by me and interpreted by radiologist, as above  Lab interpretation:  Labs viewed by me significant for, as above    While in the ED IV was placed and labs were obtained appropriate PPE was worn during exam and throughout all encounters with the patient.  Patient had the above evaluation.  Patient was offered pain medication, refused.  Lab work unremarkable including normal CBC, CMP, no UTI.  CT abdomen  pelvis shows no acute intra-abdominal abnormalities.  Patient reevaluated, again offered pain medication and she refused.  Patient recommended follow-up with primary care and GI for further evaluation and treatment as needed.    Discharge plan and instructions were discussed with the patient who verbalized understanding and is in agreement with the plan, all questions were answered at this time.  Patient is aware of signs symptoms that would require immediate return to the emergency room.  Patient understands importance of following up with primary care provider for further evaluation and worsening concerns as well as blood pressure recheck in the next 4 weeks.    Patient was discharged in improved stable condition with an upright steady gait.           Amount and/or Complexity of Data Reviewed  Clinical lab tests: reviewed and ordered  Tests in the radiology section of CPT®: reviewed and ordered    Patient Progress  Patient progress: stable      Final diagnoses:   Abdominal pain, unspecified abdominal location       ED Disposition  ED Disposition     ED Disposition Condition Comment    Discharge Stable           Xavi Manzanares Jr., MD  [address] [facility] [place] [address] [phone]    Schedule an appointment as soon as possible for a visit in 2 days  As needed, If symptoms worsen    Jonathan Maxwell MD  [address] [facility] [place] [address] [phone]    Schedule an appointment as soon as possible for a visit in 2 days  As needed, If symptoms worsen         Medication List      New Prescriptions    ondansetron ODT 4 MG disintegrating tablet  Commonly known as: Zofran ODT  Place 1 tablet under the tongue Every 8 (Eight) Hours As Needed for Nausea.           Where to Get Your Medications      These medications were sent to [id number] - [place], [place] - [address] [address] AT [facility] [phone] [facility] [phone]   [address]  [place] [address]    Phone: [phone]   · ondansetron ODT 4 MG disintegrating tablet          Ruby Yoo PA  11/17/21 2006

## 2021-11-18 NOTE — DISCHARGE INSTRUCTIONS
Take Tylenol or ibuprofen as needed for pain  Take Zofran as needed for nausea.    Drink plenty of fluids    Follow-up with primary care for further management evaluation    Follow-up with GI for further evaluation and treatment as needed    Return to the ER for new or worsening symptoms

## 2023-02-10 ENCOUNTER — APPOINTMENT (OUTPATIENT)
Dept: CT IMAGING | Facility: HOSPITAL | Age: 38
End: 2023-02-10
Payer: COMMERCIAL

## 2023-02-10 ENCOUNTER — HOSPITAL ENCOUNTER (EMERGENCY)
Facility: HOSPITAL | Age: 38
Discharge: HOME | End: 2023-02-10
Attending: EMERGENCY MEDICINE | Admitting: EMERGENCY MEDICINE
Payer: COMMERCIAL

## 2023-02-10 VITALS
WEIGHT: 252.65 LBS | OXYGEN SATURATION: 99 % | DIASTOLIC BLOOD PRESSURE: 94 MMHG | RESPIRATION RATE: 16 BRPM | SYSTOLIC BLOOD PRESSURE: 133 MMHG | TEMPERATURE: 98.3 F | HEIGHT: 64 IN | HEART RATE: 59 BPM | BODY MASS INDEX: 43.13 KG/M2

## 2023-02-10 DIAGNOSIS — Z87.42 HISTORY OF ENDOMETRIOSIS: ICD-10-CM

## 2023-02-10 DIAGNOSIS — M53.3 PAIN IN THE COCCYX: Primary | ICD-10-CM

## 2023-02-10 DIAGNOSIS — N83.202 LEFT OVARIAN CYST: ICD-10-CM

## 2023-02-10 LAB
ANION GAP SERPL CALCULATED.3IONS-SCNC: 10 MMOL/L (ref 5–15)
BASOPHILS # BLD AUTO: 0 10*3/MM3 (ref 0–0.2)
BASOPHILS NFR BLD AUTO: 0.5 % (ref 0–1.5)
BUN SERPL-MCNC: 12 MG/DL (ref 6–20)
BUN/CREAT SERPL: 14.1 (ref 7–25)
CALCIUM SPEC-SCNC: 9.3 MG/DL (ref 8.6–10.5)
CHLORIDE SERPL-SCNC: 102 MMOL/L (ref 98–107)
CO2 SERPL-SCNC: 27 MMOL/L (ref 22–29)
CREAT SERPL-MCNC: 0.85 MG/DL (ref 0.57–1)
DEPRECATED RDW RBC AUTO: 43.8 FL (ref 37–54)
EGFRCR SERPLBLD CKD-EPI 2021: 90.6 ML/MIN/1.73
EOSINOPHIL # BLD AUTO: 0.1 10*3/MM3 (ref 0–0.4)
EOSINOPHIL NFR BLD AUTO: 1.9 % (ref 0.3–6.2)
ERYTHROCYTE [DISTWIDTH] IN BLOOD BY AUTOMATED COUNT: 14.3 % (ref 12.3–15.4)
GLUCOSE SERPL-MCNC: 90 MG/DL (ref 65–99)
HCT VFR BLD AUTO: 39.9 % (ref 34–46.6)
HGB BLD-MCNC: 13 G/DL (ref 12–15.9)
LYMPHOCYTES # BLD AUTO: 1.8 10*3/MM3 (ref 0.7–3.1)
LYMPHOCYTES NFR BLD AUTO: 25.4 % (ref 19.6–45.3)
MCH RBC QN AUTO: 28.6 PG (ref 26.6–33)
MCHC RBC AUTO-ENTMCNC: 32.5 G/DL (ref 31.5–35.7)
MCV RBC AUTO: 87.9 FL (ref 79–97)
MONOCYTES # BLD AUTO: 0.5 10*3/MM3 (ref 0.1–0.9)
MONOCYTES NFR BLD AUTO: 6.9 % (ref 5–12)
NEUTROPHILS NFR BLD AUTO: 4.7 10*3/MM3 (ref 1.7–7)
NEUTROPHILS NFR BLD AUTO: 65.3 % (ref 42.7–76)
NRBC BLD AUTO-RTO: 0 /100 WBC (ref 0–0.2)
PLATELET # BLD AUTO: 256 10*3/MM3 (ref 140–450)
PMV BLD AUTO: 8.6 FL (ref 6–12)
POTASSIUM SERPL-SCNC: 4.1 MMOL/L (ref 3.5–5.2)
RBC # BLD AUTO: 4.54 10*6/MM3 (ref 3.77–5.28)
SODIUM SERPL-SCNC: 139 MMOL/L (ref 136–145)
WBC NRBC COR # BLD: 7.2 10*3/MM3 (ref 3.4–10.8)

## 2023-02-10 PROCEDURE — 72193 CT PELVIS W/DYE: CPT

## 2023-02-10 PROCEDURE — 99284 EMERGENCY DEPT VISIT MOD MDM: CPT

## 2023-02-10 PROCEDURE — 99283 EMERGENCY DEPT VISIT LOW MDM: CPT

## 2023-02-10 PROCEDURE — 85025 COMPLETE CBC W/AUTO DIFF WBC: CPT | Performed by: NURSE PRACTITIONER

## 2023-02-10 PROCEDURE — 25010000002 KETOROLAC TROMETHAMINE PER 15 MG: Performed by: NURSE PRACTITIONER

## 2023-02-10 PROCEDURE — 80048 BASIC METABOLIC PNL TOTAL CA: CPT | Performed by: NURSE PRACTITIONER

## 2023-02-10 PROCEDURE — 0 IOPAMIDOL PER 1 ML: Performed by: NURSE PRACTITIONER

## 2023-02-10 PROCEDURE — 96374 THER/PROPH/DIAG INJ IV PUSH: CPT

## 2023-02-10 RX ORDER — KETOROLAC TROMETHAMINE 15 MG/ML
15 INJECTION, SOLUTION INTRAMUSCULAR; INTRAVENOUS ONCE
Status: COMPLETED | OUTPATIENT
Start: 2023-02-10 | End: 2023-02-10

## 2023-02-10 RX ORDER — SODIUM CHLORIDE 0.9 % (FLUSH) 0.9 %
10 SYRINGE (ML) INJECTION AS NEEDED
Status: DISCONTINUED | OUTPATIENT
Start: 2023-02-10 | End: 2023-02-10 | Stop reason: HOSPADM

## 2023-02-10 RX ADMIN — IOPAMIDOL 100 ML: 755 INJECTION, SOLUTION INTRAVENOUS at 18:29

## 2023-02-10 RX ADMIN — KETOROLAC TROMETHAMINE 15 MG: 15 INJECTION, SOLUTION INTRAMUSCULAR; INTRAVENOUS at 20:24

## 2023-02-10 NOTE — ED PROVIDER NOTES
"Subjective      Provider in Triage Note  Patient is a 37-year-old white female who presents for complaints of rectal pain.  She states it started 2 days ago.  She denies any known injury or trauma.  Complains of a constant pain worse with bowel movement.  She denies any new diarrhea constipation black or bloody stool.  Denies pain with wiping.  She denies any fever chills nausea vomiting.  Denies abdominal pain.      History of Present Illness  37-year-old female presents with 2-day history of pain at her tailbone that radiates to the rectum.  She reports that she has had nonbloody diarrhea, but this is normal for her due to having cholecystectomy.  She denies musculoskeletal or rectal injury.  She denies previous episode of the same.  Patient stated \"I feel like I have to go to the bathroom all the time\".  She denies associated abdominal pain.  Denies nausea vomiting.  Denies any abnormal vaginal bleeding or discharge.  Her history is also significant for endometriosis.    Primary care: Dr. Manzanares    Said the following abdominal surgeries:  x2, cholecystectomy    Last menstrual period 2 weeks ago, tubal ligation            Review of Systems    Past Medical History:   Diagnosis Date   • Stomach disorder    • Stomach ulcer        No Known Allergies    Past Surgical History:   Procedure Laterality Date   • CHOLECYSTECTOMY  2016       Family History   Problem Relation Age of Onset   • Breast cancer Sister 40        had Genetic testing, Negative   • Pancreatic cancer Father        Social History     Socioeconomic History   • Marital status:    Tobacco Use   • Smoking status: Never   • Smokeless tobacco: Never   Substance and Sexual Activity   • Alcohol use: Yes     Comment: 1-2 per week   • Drug use: Never   • Sexual activity: Defer           Objective   Physical Exam  Vitals and nursing note reviewed. Exam conducted with a chaperone present (OLIVER Hendricks present at bedside).   Constitutional:       General: " She is awake. She is not in acute distress.     Appearance: Normal appearance. She is well-developed. She is obese. She is not ill-appearing, toxic-appearing or diaphoretic.   HENT:      Mouth/Throat:      Mouth: Mucous membranes are moist.      Pharynx: Oropharynx is clear.   Eyes:      General: No scleral icterus.  Cardiovascular:      Rate and Rhythm: Normal rate and regular rhythm.      Pulses: Normal pulses.      Heart sounds: Normal heart sounds, S1 normal and S2 normal. Heart sounds not distant. No murmur heard.    No friction rub. No gallop.   Pulmonary:      Effort: Pulmonary effort is normal.      Breath sounds: Normal breath sounds and air entry.   Abdominal:      General: Abdomen is flat. Bowel sounds are normal. There is no distension.      Palpations: Abdomen is soft. There is no mass.      Tenderness: There is no abdominal tenderness. There is no right CVA tenderness, left CVA tenderness, guarding or rebound.      Hernia: No hernia is present.   Genitourinary:     Rectum: Normal. No mass, anal fissure or external hemorrhoid.      Comments: External exam reveals no hemorrhoid, mass, fissure.  She is having no melena hematochezia, internal exam not performed.  Musculoskeletal:      Cervical back: Normal.      Thoracic back: Normal.      Lumbar back: No swelling, edema, signs of trauma, tenderness or bony tenderness. Normal range of motion.        Back:       Comments: Patient has no overlying erythema nor edema noted.   Skin:     General: Skin is warm and dry.      Capillary Refill: Capillary refill takes less than 2 seconds.      Coloration: Skin is not jaundiced or pale.      Findings: No rash.   Neurological:      Mental Status: She is alert and oriented to person, place, and time.      GCS: GCS eye subscore is 4. GCS verbal subscore is 5. GCS motor subscore is 6.   Psychiatric:         Mood and Affect: Mood normal.         Behavior: Behavior normal. Behavior is cooperative.         Thought Content:  "Thought content normal.         Judgment: Judgment normal.         Procedures           ED Course                                           Medical Decision Making  Amount and/or Complexity of Data Reviewed  Labs: ordered. Decision-making details documented in ED Course.  Radiology: ordered. Decision-making details documented in ED Course.        Interpreted by radiologist as below:     CT Pelvis With Contrast    Result Date: 2/10/2023  Impression: 1. No acute pelvic abnormality. Electronically Signed: Lamin Haywood  2/10/2023 6:55 PM EST  Workstation ID: [id number]        /85   Pulse 62   Temp 97.8 °F (36.6 °C) (Oral)   Resp 16   Ht 162.6 cm (64\")   Wt 115 kg (252 lb 10.4 oz)   SpO2 100%   BMI 43.37 kg/m²      Lab Results (last 24 hours)     Procedure Component Value Units Date/Time    CBC & Differential [[id number]]  (Normal) Collected: 02/10/23 1725    Specimen: Blood from Arm, Left Updated: 02/10/23 1732    Narrative:      The following orders were created for panel order CBC & Differential.  Procedure                               Abnormality         Status                     ---------                               -----------         ------                     CBC Auto Differential[[id number]]        Normal              Final result                 Please view results for these tests on the individual orders.    Basic Metabolic Panel [[id number]]  (Normal) Collected: 02/10/23 1725    Specimen: Blood from Arm, Left Updated: 02/10/23 1749     Glucose 90 mg/dL      BUN 12 mg/dL      Creatinine 0.85 mg/dL      Sodium 139 mmol/L      Potassium 4.1 mmol/L      Chloride 102 mmol/L      CO2 27.0 mmol/L      Calcium 9.3 mg/dL      BUN/Creatinine Ratio 14.1     Anion Gap 10.0 mmol/L      eGFR 90.6 mL/min/1.73     Narrative:      GFR Normal >60  Chronic Kidney Disease <60  Kidney Failure <15      CBC Auto Differential [[id number]]  (Normal) Collected: 02/10/23 1725    Specimen: Blood from Arm, Left Updated: " 02/10/23 1732     WBC 7.20 10*3/mm3      RBC 4.54 10*6/mm3      Hemoglobin 13.0 g/dL      Hematocrit 39.9 %      MCV 87.9 fL      MCH 28.6 pg      MCHC 32.5 g/dL      RDW 14.3 %      RDW-SD 43.8 fl      MPV 8.6 fL      Platelets 256 10*3/mm3      Neutrophil % 65.3 %      Lymphocyte % 25.4 %      Monocyte % 6.9 %      Eosinophil % 1.9 %      Basophil % 0.5 %      Neutrophils, Absolute 4.70 10*3/mm3      Lymphocytes, Absolute 1.80 10*3/mm3      Monocytes, Absolute 0.50 10*3/mm3      Eosinophils, Absolute 0.10 10*3/mm3      Basophils, Absolute 0.00 10*3/mm3      nRBC 0.0 /100 WBC            Medications   sodium chloride 0.9 % flush 10 mL (has no administration in time range)   ketorolac (TORADOL) injection 15 mg (has no administration in time range)   iopamidol (ISOVUE-370) 76 % injection 100 mL (100 mL Intravenous Given 2/10/23 1829)        Patient undressed and placed in gown for exam.  Appropriate PPE worn during patient exam.  Appropriate monitoring initiated. Patient is alert and oriented x3.  No acute distress noted.  Abdomen is obese, round, nontender.  She has no erythema nor edema noted to the coccyx area.  No overlying ecchymosis.  No fissure, mass, hemorrhoids noted.  IV established and labs obtained per provider in triage.  CT of the pelvis with IV contrast obtained per provider in triage I reviewed chart 10/11/2022 patient was seen by sleep medicine due to hypersomnia and insomnia with snoring and apnea. My radiology interpretation no rectal abscess noted. Differential Diagnoses, not all-inclusive and does not constitute entirety of all causes: Rectal abscess, anal fissure, hemorrhoid.  Rectal abscess was ruled out by CT.  There is no anal fissure or hemorrhoids noted on exam.  Patient's pain is thought to be referred from her history of endometriosis, as well as left ovarian cyst.  Patient was discharged home on diclofenac encouraged to push fluids rotate heat and ice.  She is established with OB/GYN,   Zahra.    Disposition/Treatment: Discussed results with patient, verbalized understanding.  Discussed reasons to return to the ER, patient verbalized understanding.  Agreeable with plan of care.  Patient was stable upon discharge.    Upon reassessment, patient is pink warm and dry no acute distress.  She was given 1 dose of Toradol while in the ER.    Part of this note may be an electronic transcription/translation of spoken language to printed text using the Dragon Dictation System.         Final diagnoses:   Pain in the coccyx   Left ovarian cyst   History of endometriosis       ED Disposition  ED Disposition     ED Disposition   Discharge    Condition   Stable    Comment   --             Xavi Manzanares Jr., MD  [address] [facility] [place] [id number] [phone]    Schedule an appointment as soon as possible for a visit   As needed    Ann Sweeney MD  [facility] [address] [place] [address] [phone]    Schedule an appointment as soon as possible for a visit       [facility] EMERGENCY DEPARTMENT  [address] [facility] [address] [phone]  Go to   If symptoms worsen         Medication List      New Prescriptions    diclofenac 50 MG EC tablet  Commonly known as: VOLTAREN  Take 1 tablet by mouth 3 (Three) Times a Day for 5 days.           Where to Get Your Medications      These medications were sent to [id number] - [facility] [place] - [address] [place] [address] AT [address] [address] - [phone]  - [phone] [address] [place] [address]    Phone: [phone]   · diclofenac 50 MG EC tablet          Fransisca Garrido, APRN  02/10/23 2020

## 2023-02-11 NOTE — DISCHARGE INSTRUCTIONS
Take diclofenac as prescribed with food.  Make sure you are drinking at least 125 ounces of water daily.  Rotate heat and ice every 2 hours while awake, on for 20 minutes.  Schedule follow-up with primary care as needed.  Schedule follow-up with OB/GYN for management of cyst and endometriosis.  Return to the ER for new or worsening symptoms.